# Patient Record
Sex: MALE | Race: WHITE | HISPANIC OR LATINO | URBAN - METROPOLITAN AREA
[De-identification: names, ages, dates, MRNs, and addresses within clinical notes are randomized per-mention and may not be internally consistent; named-entity substitution may affect disease eponyms.]

---

## 2017-01-22 ENCOUNTER — EMERGENCY (EMERGENCY)
Facility: HOSPITAL | Age: 22
LOS: 1 days | Discharge: HOME | End: 2017-01-22
Attending: EMERGENCY MEDICINE | Admitting: EMERGENCY MEDICINE
Payer: COMMERCIAL

## 2017-01-22 VITALS
SYSTOLIC BLOOD PRESSURE: 149 MMHG | HEART RATE: 89 BPM | TEMPERATURE: 98 F | OXYGEN SATURATION: 98 % | DIASTOLIC BLOOD PRESSURE: 99 MMHG | RESPIRATION RATE: 16 BRPM

## 2017-01-22 PROCEDURE — 70160 X-RAY EXAM OF NASAL BONES: CPT | Mod: 26

## 2017-01-22 PROCEDURE — 99283 EMERGENCY DEPT VISIT LOW MDM: CPT

## 2017-01-22 RX ADMIN — Medication 1 TABLET(S): at 13:03

## 2017-01-22 NOTE — ED PROVIDER NOTE - DETAILS:
MD Cardoza:  The scribe's documentation has been prepared under my direction and personally reviewed by me in its entirety. I confirm that the note above accurately reflects all work, treatment, procedures, and medical decision making performed by me.  MD Cardoza:  see the MDM & progress notes for my I&P.

## 2017-01-22 NOTE — ED PROVIDER NOTE - NS ED MD SCRIBE ATTENDING SCRIBE SECTIONS
HISTORY OF PRESENT ILLNESS/REVIEW OF SYSTEMS/HIV/DISPOSITION/PAST MEDICAL/SURGICAL/SOCIAL HISTORY/VITAL SIGNS( Pullset) HISTORY OF PRESENT ILLNESS/HIV/DISPOSITION/VITAL SIGNS( Pullset)/REVIEW OF SYSTEMS/PHYSICAL EXAM/PAST MEDICAL/SURGICAL/SOCIAL HISTORY

## 2017-01-22 NOTE — ED PROVIDER NOTE - OBJECTIVE STATEMENT
20 y/o M pt presents to the ED for nasal bridge deformity and associated pain s/p injury approx. 30 mins ago in which he was elbowed on the face while playing soccer. Denies LOC. 20 y/o M pt presenting with nasal pain and mild epistaxis immediately after being elbowed in the face playing soccer. Bleeding stopped with direct pressure. Main concern is grossly deformed nose. Denies LOC, change to sense of smell or neck pain.

## 2017-01-22 NOTE — ED PROVIDER NOTE - MEDICAL DECISION MAKING DETAILS
20 y/o M pt with closed nasal fracture without septal hematoma. Plan: plastics to reduce in ED. Patient is to follow up with plastics in 1 week. MD Cardoza:  22 y/o M pt with closed nasal fracture without septal hematoma < 2 hrs ago. Plan: plastics to reduce/splint in ED. Patient is to follow up with plastic surgery in 1 week.

## 2017-01-22 NOTE — ED PROVIDER NOTE - PLAN OF CARE
Follow up with Dr. Vick [phone], plastic surgeon tomorrow.  Clindamycin 600 mg 3 times a day for 10 days.  Worsening pain, fever, swelling, or any other other symptoms of concern return to ED.

## 2017-01-22 NOTE — ED PROVIDER NOTE - CONSTITUTIONAL, MLM
normal... Adult male. Well appearing, well nourished, awake, alert, oriented to person, place, time/situation and in no apparent distress.

## 2017-01-22 NOTE — ED PROVIDER NOTE - PROGRESS NOTE DETAILS
TELMA Leigh:  seen by plastics, XR confirms fracture, nose reset.  will d/c w/ augmentin, plastics f/u

## 2017-01-22 NOTE — ED PROVIDER NOTE - CARE PLAN
Principal Discharge DX:	Closed fracture of nasal bone, initial encounter Principal Discharge DX:	Closed fracture of nasal bone, initial encounter  Instructions for follow-up, activity and diet:	Follow up with Dr. Vick [phone], plastic surgeon tomorrow.  Clindamycin 600 mg 3 times a day for 10 days.  Worsening pain, fever, swelling, or any other other symptoms of concern return to ED. Principal Discharge DX:	Closed fracture of nasal bone, initial encounter  Instructions for follow-up, activity and diet:	Follow up with Dr. Vick [phone], plastic surgeon tomorrow.  Clindamycin 600 mg 3 times a day for 10 days.  Worsening pain, fever, swelling, or any other other symptoms of concern return to ED.

## 2017-01-22 NOTE — ED PROVIDER NOTE - EYES NEGATIVE STATEMENT, MLM
no discharge, no irritation, no pain, no redness, and no visual changes. No blurred vision or diplopia.

## 2017-01-31 ENCOUNTER — TRANSCRIPTION ENCOUNTER (OUTPATIENT)
Age: 22
End: 2017-01-31

## 2021-02-10 ENCOUNTER — APPOINTMENT (OUTPATIENT)
Dept: ORTHOPEDIC SURGERY | Facility: CLINIC | Age: 26
End: 2021-02-10
Payer: COMMERCIAL

## 2021-02-10 VITALS
WEIGHT: 190 LBS | HEIGHT: 72 IN | OXYGEN SATURATION: 97 % | BODY MASS INDEX: 25.73 KG/M2 | SYSTOLIC BLOOD PRESSURE: 119 MMHG | DIASTOLIC BLOOD PRESSURE: 65 MMHG | HEART RATE: 66 BPM

## 2021-02-10 DIAGNOSIS — M25.572 PAIN IN LEFT ANKLE AND JOINTS OF LEFT FOOT: ICD-10-CM

## 2021-02-10 DIAGNOSIS — Z78.9 OTHER SPECIFIED HEALTH STATUS: ICD-10-CM

## 2021-02-10 PROCEDURE — 99072 ADDL SUPL MATRL&STAF TM PHE: CPT

## 2021-02-10 PROCEDURE — 73610 X-RAY EXAM OF ANKLE: CPT | Mod: LT

## 2021-02-10 PROCEDURE — 99204 OFFICE O/P NEW MOD 45 MIN: CPT

## 2021-02-10 NOTE — HISTORY OF PRESENT ILLNESS
[Flip Flops] : flip flops [FreeTextEntry1] : Pt presents for initial evaluation with pain in his left ankle, pt evidently  was playing soccer yesterday  and rolled hisleft  ankle and felt crack and  fell to floor. Pt applied Ice and elevated he has taken Advil and Tylenol with no real relief.  Pt is a phys .

## 2021-02-10 NOTE — DISCUSSION/SUMMARY
[de-identified] : The patient was informed of his findings.  He was placed in a compression wrap given crutches and fitted for an ankle stirrup brace.  The patient will modify his weightbearing and activity level accordingly.  Will maintain elevation use ice and follow-up in 2 weeks time to check his progress.  Patient will be started on aspirin daily for DVT prophylaxis.

## 2021-02-10 NOTE — PHYSICAL EXAM
[de-identified] : Physical examination of the left ankle discloses lateral malleolar soft tissue swelling and tenderness.  Patient is resisting flexion extension and inversion of the ankle secondary to tenderness.  No perceivable instability appreciated within the limitations of this examination.  No defects or deformities otherwise noted.  Neurovascular status to the left foot and ankle intact.  No medial joint or malleolar tenderness [de-identified] : X-rays taken of the left ankle and AP lateral and internal oblique projections do not reveal any acute fractures.  An avulsion fragment is noted on the lateral projection which appears to be an old injury finding.

## 2021-02-19 ENCOUNTER — APPOINTMENT (OUTPATIENT)
Dept: ORTHOPEDIC SURGERY | Facility: CLINIC | Age: 26
End: 2021-02-19
Payer: COMMERCIAL

## 2021-02-19 VITALS
OXYGEN SATURATION: 97 % | BODY MASS INDEX: 25.73 KG/M2 | HEART RATE: 72 BPM | HEIGHT: 72 IN | SYSTOLIC BLOOD PRESSURE: 138 MMHG | WEIGHT: 190 LBS | DIASTOLIC BLOOD PRESSURE: 88 MMHG

## 2021-02-19 DIAGNOSIS — S93.412A SPRAIN OF CALCANEOFIBULAR LIGAMENT OF LEFT ANKLE, INITIAL ENCOUNTER: ICD-10-CM

## 2021-02-19 PROCEDURE — 99213 OFFICE O/P EST LOW 20 MIN: CPT

## 2021-02-19 PROCEDURE — 99072 ADDL SUPL MATRL&STAF TM PHE: CPT

## 2021-03-17 ENCOUNTER — APPOINTMENT (OUTPATIENT)
Dept: ORTHOPEDIC SURGERY | Facility: CLINIC | Age: 26
End: 2021-03-17
Payer: COMMERCIAL

## 2021-03-17 VITALS
SYSTOLIC BLOOD PRESSURE: 108 MMHG | OXYGEN SATURATION: 96 % | DIASTOLIC BLOOD PRESSURE: 75 MMHG | WEIGHT: 190 LBS | HEART RATE: 68 BPM | HEIGHT: 72 IN | BODY MASS INDEX: 25.73 KG/M2

## 2021-03-17 DIAGNOSIS — S93.492D SPRAIN OF OTHER LIGAMENT OF LEFT ANKLE, SUBSEQUENT ENCOUNTER: ICD-10-CM

## 2021-03-17 PROCEDURE — 73610 X-RAY EXAM OF ANKLE: CPT | Mod: LT

## 2021-03-17 PROCEDURE — 99072 ADDL SUPL MATRL&STAF TM PHE: CPT

## 2021-03-17 PROCEDURE — 99213 OFFICE O/P EST LOW 20 MIN: CPT

## 2021-03-17 NOTE — PHYSICAL EXAM
[de-identified] :  physical examination of the left ankle discloses mild tenderness to palpation with resolving soft tissue swelling lateral malleolus.  No acute instability no neurovascular deficits. [de-identified] : X-rays obtained today of the left ankle and AP lateral and oblique projections do not reveal any acute osseous changes.  No evidence of talar shift or widened medial clear space.

## 2021-03-17 NOTE — DISCUSSION/SUMMARY
[de-identified] : Patient was placed back in a compression wrap and ankle stirrup brace.  He was given instructions to continue elevation ice and use of the brace and compression wrap.  He will gradually progress his weightbearing status as tolerated.  Patient will also be referred to physical therapy as an adjunct modality.  Follow-up in 3 to 4 weeks to check his progress.  At that time repeat x-rays of the left ankle will be obtained.

## 2021-03-17 NOTE — HISTORY OF PRESENT ILLNESS
[Sneakers] : meagan [FreeTextEntry1] : Pt returns for follow up for his grade 2 left ankle sprain. Pt states he ran 3 days ago and felt discomfort t the lateral left ankle. Pt is not taking any pain medication. Pt is using a ankle compression sleeve for support.

## 2021-03-17 NOTE — DISCUSSION/SUMMARY
[de-identified] : Patient was informed of his findings.  He will continue using cryotherapy and elastic ankle support.  Patient will gradually return to sports and phys ed activities once his condition resolves.  Follow-up as needed

## 2021-03-17 NOTE — HISTORY OF PRESENT ILLNESS
[Other: ____] : [unfilled] [FreeTextEntry1] : Pt returns for follow up for his left ankle pain. Pt is ambulating with crutches and has an ace wrap and ankle stirrup brace in place. Pt has ecchymosis noted to the lateral ankle. Pt is taking Tylenol and Advil for pain prn. Pt is taking ASA daily.

## 2021-03-17 NOTE — PHYSICAL EXAM
[de-identified] : Physical examination of the left ankle disclosing residual lateral malleolar swelling and discoloration.  There is still considerable local tenderness of the lateral malleolar region, however there is no stress instability.

## 2021-06-14 ENCOUNTER — APPOINTMENT (OUTPATIENT)
Dept: ORTHOPEDIC SURGERY | Facility: CLINIC | Age: 26
End: 2021-06-14

## 2023-04-03 ENCOUNTER — TRANSCRIPTION ENCOUNTER (OUTPATIENT)
Age: 28
End: 2023-04-03

## 2023-04-03 ENCOUNTER — APPOINTMENT (OUTPATIENT)
Dept: ORTHOPEDIC SURGERY | Facility: CLINIC | Age: 28
End: 2023-04-03
Payer: COMMERCIAL

## 2023-04-03 ENCOUNTER — NON-APPOINTMENT (OUTPATIENT)
Age: 28
End: 2023-04-03

## 2023-04-03 PROBLEM — Z00.00 ENCOUNTER FOR PREVENTIVE HEALTH EXAMINATION: Noted: 2023-04-03

## 2023-04-03 PROCEDURE — 99214 OFFICE O/P EST MOD 30 MIN: CPT | Mod: 25

## 2023-04-03 PROCEDURE — 20610 DRAIN/INJ JOINT/BURSA W/O US: CPT | Mod: RT

## 2023-04-03 PROCEDURE — 73562 X-RAY EXAM OF KNEE 3: CPT | Mod: RT

## 2023-04-04 ENCOUNTER — APPOINTMENT (OUTPATIENT)
Dept: ORTHOPEDIC SURGERY | Facility: CLINIC | Age: 28
End: 2023-04-04

## 2023-04-04 LAB
B PERT IGG+IGM PNL SER: ABNORMAL
COLOR FLD: YELLOW
EOSINOPHIL # FLD MANUAL: 0 %
FLUID INTAKE SUBSTANCE CLASS: NORMAL
LYMPHOCYTES # FLD MANUAL: 26 %
MESOTHL CELL NFR FLD: 0 %
MONOS+MACROS NFR FLD MANUAL: 14 %
NEUTS SEG # FLD MANUAL: 60 %
NRBC # FLD: 0 %
RBC # FLD MANUAL: 3000 /UL
SYCRY CLARITY: ABNORMAL
SYCRY COLOR: YELLOW
SYCRY ID: NORMAL
SYCRY TUBE: NORMAL
TOTAL CELLS COUNTED FLD: NORMAL /UL
TUBE TYPE: NORMAL
UNIDENT CELLS NFR FLD MANUAL: 0 %
VARIANT LYMPHS # FLD MANUAL: 0 %

## 2023-04-05 ENCOUNTER — TRANSCRIPTION ENCOUNTER (OUTPATIENT)
Age: 28
End: 2023-04-05

## 2023-04-14 NOTE — PROCEDURE
[de-identified] : Aspiration: Right knee joint.\par Indication: Effusion. \par \par A discussion was had with the patient regarding this procedure and all questions were answered. All risks, benefits and alternatives were discussed. These included but were not limited to bleeding, infection, and reaccumulation of fluid. Alcohol was used to clean the skin, and betadine was used to sterilize and prep the area in the supero-lateral aspect of the right knee. Ethyl chloride spray was then used as a topical anesthetic. An 18-gauge needle was used to aspirate the knee joint and approximately 65 cc of cloudy inflammatory fluid was aspirated from the knee without complication. A sterile bandage was then applied. The patient tolerated the procedure well.

## 2023-04-14 NOTE — HISTORY OF PRESENT ILLNESS
[de-identified] : 27 year old male s/p right knee ACL reconstruction BTB and meniscus repair? 2021, BLU October 2021 by Dr. Alvares  presents today with right knee swelling and stiffness. Patient state that he was playing soccer on Sunday and developed soreness and swelling after. C/O stiffness. Advil/ NAproxen is not helpful. Denies buckling, catching, numbness or tingling. \par \par The patient's past medical history, past surgical history, medications and allergies were reviewed by me today with the patient and documented accordingly. In addition, the patient's family and social history, which were noncontributory to this visit, were reviewed also.

## 2023-04-14 NOTE — DISCUSSION/SUMMARY
[de-identified] : 26 y/o male with right knee effusion status post ACL reconstruction\par \par Patient presents for evaluation of acute right knee pain s/p ACL reconstruction. Clinically, the knee feels stable to anterior cruciate ligament testing both on anterior drawer and Lachman testing.. I do not suspect any significant internal derangement to the ligamentous structures.  Patient does report history of meniscal pathology, and I discussed that this may be of concern.  However, at this time would recommend treatment for persistent symptoms pertaining to his effusion. Patient was given aspiration for therapeutic and symptomatic relief of current effusion. The fluid was sent for analysis given cloudy inflammatory nature. \par \par Recommendation: Conservative care & observation; including rest/activity avoidance until less symptomatic with subsequent gradual return to full low impact activity as tolerated.  NSAIDs as prescribed, with application of ice/heat to the area 2-3x daily for 20 minutes after periods of activity.  Follow-up after laboratory analysis.\par \par Follow up 4wks as needed.

## 2023-04-14 NOTE — ADDENDUM
[FreeTextEntry1] : This note was written by Hillary Wall on 04/03/2023 acting solely as a scribe for Dr. Dennis Florez.\par \par All medical record entries made by the Scribe were at my, Dr. Dennis Florez, direction and personally dictated by me on 04/03/2023. I have personally reviewed the chart and agree that the record accurately reflects my personal performance of the history, physical exam, assessment and plan.

## 2023-04-14 NOTE — PHYSICAL EXAM
[de-identified] : Oriented to time, place, person\par Mood: Normal\par Affect: Normal\par Appearance: Healthy, well appearing, no acute distress.\par Gait: Normal\par Assistive Devices: None\par \par Right Knee Exam:\par \par Skin: Clean, dry, intact\par Inspection: No obvious malalignment, no masses, + swelling, + effusion\par Pulses: 2+ DP/PT pulses \par ROM: 0-120 degrees of flexion. + pain with deep knee flexion/extension.\par Tenderness: No MJLT. No LJLT. No pain over the patella facets. No pain to the quadriceps tendon. No pain to the patella tendon. No posterior knee tenderness.\par Stability: Stable to varus, valgus. IA Lachman testing. Negative anterior drawer, negative posterior drawer.\par Strength: 5/5 Q/H/TA/GS/EHL, without atrophy\par Neuro: Intact to light touch throughout, DTRs normal\par Additional Tests: Negative Mary's test, Negative patellar grind test  [de-identified] : The following radiographs were ordered and read by me during this patients visit. I reviewed each radiograph in detail with the patient and discussed the findings as highlighted below. \par \par 4 views of the right knee were obtained today, 04/03/2023, that show no acute fracture or dislocation. There is no medial, no lateral and no patellofemoral degenerative changes seen. There is no significant malalignment. There is evidence of prior ACL reconstruction no metal. Large effusion.

## 2023-04-17 ENCOUNTER — APPOINTMENT (OUTPATIENT)
Dept: ORTHOPEDIC SURGERY | Facility: CLINIC | Age: 28
End: 2023-04-17
Payer: COMMERCIAL

## 2023-04-17 DIAGNOSIS — M25.461 EFFUSION, RIGHT KNEE: ICD-10-CM

## 2023-04-17 PROCEDURE — 99213 OFFICE O/P EST LOW 20 MIN: CPT

## 2023-04-17 RX ORDER — NAPROXEN 500 MG/1
500 TABLET ORAL
Qty: 40 | Refills: 0 | Status: ACTIVE | COMMUNITY
Start: 2023-04-03 | End: 1900-01-01

## 2023-04-20 LAB — BACTERIA FLD CULT: NORMAL

## 2023-04-24 ENCOUNTER — APPOINTMENT (OUTPATIENT)
Dept: INTERNAL MEDICINE | Facility: CLINIC | Age: 28
End: 2023-04-24

## 2023-04-28 PROBLEM — M25.461 EFFUSION OF RIGHT KNEE: Status: ACTIVE | Noted: 2023-04-14

## 2023-04-28 NOTE — ADDENDUM
[FreeTextEntry1] : This note was written by Hillary Wall on 04/17/2023 acting solely as a scribe for Dr. Dennis Florez.\par \par All medical record entries made by the Scribe were at my, Dr. Dennis Florez, direction and personally dictated by me on 04/17/2023. I have personally reviewed the chart and agree that the record accurately reflects my personal performance of the history, physical exam, assessment and plan.

## 2023-04-28 NOTE — HISTORY OF PRESENT ILLNESS
[de-identified] : 27 year old male s/p right knee ACL (BTB auto) and meniscus repair? 2021, BLU October 2021 by Dr. Key presents for reevaluation of a right knee effusion. He reports significant improvement in pain and stiffness following aspiration 4/3/23. He has been taking naproxen 1-2x daily. He currently has no pain. He would like to return to sports and would like clearance to do so. Laboratory analysis of synovial fluid was normal as documented.

## 2023-04-28 NOTE — PHYSICAL EXAM
[de-identified] : Oriented to time, place, person\par Mood: Normal\par Affect: Normal\par Appearance: Healthy, well appearing, no acute distress.\par Gait: Normal\par Assistive Devices: None\par \par Right Knee Exam:\par \par Skin: Clean, dry, intact\par Inspection: No obvious malalignment, no masses, minimal swelling, no effusion\par Pulses: 2+ DP/PT pulses \par ROM: 0-130 degrees of flexion.\par Tenderness: No MJLT. No LJLT. No pain over the patella facets. No pain to the quadriceps tendon. No pain to the patella tendon. No posterior knee tenderness.\par Stability: Stable to varus, valgus. IA Lachman testing. Negative anterior drawer, negative posterior drawer.\par Strength: 5/5 Q/H/TA/GS/EHL, without atrophy\par Neuro: Intact to light touch throughout, DTRs normal\par Additional Tests: Negative Mary's test, Negative patellar grind test  [de-identified] : 4 views of the right knee were obtained 04/03/2023, that show no acute fracture or dislocation. There is no medial, no lateral and no patellofemoral degenerative changes seen. There is no significant malalignment. There is evidence of prior ACL reconstruction no metal. Large effusion.

## 2023-07-20 ENCOUNTER — APPOINTMENT (OUTPATIENT)
Dept: INTERNAL MEDICINE | Facility: CLINIC | Age: 28
End: 2023-07-20
Payer: COMMERCIAL

## 2023-07-20 VITALS
WEIGHT: 200 LBS | HEART RATE: 64 BPM | OXYGEN SATURATION: 98 % | BODY MASS INDEX: 27.09 KG/M2 | SYSTOLIC BLOOD PRESSURE: 124 MMHG | RESPIRATION RATE: 12 BRPM | HEIGHT: 72 IN | TEMPERATURE: 97.8 F | DIASTOLIC BLOOD PRESSURE: 77 MMHG

## 2023-07-20 DIAGNOSIS — Z00.00 ENCOUNTER FOR GENERAL ADULT MEDICAL EXAMINATION W/OUT ABNORMAL FINDINGS: ICD-10-CM

## 2023-07-20 DIAGNOSIS — Z82.49 FAMILY HISTORY OF ISCHEMIC HEART DISEASE AND OTHER DISEASES OF THE CIRCULATORY SYSTEM: ICD-10-CM

## 2023-07-20 PROCEDURE — 99385 PREV VISIT NEW AGE 18-39: CPT

## 2023-07-20 NOTE — ASSESSMENT
[FreeTextEntry1] : INITIAL CPE OF 28 Y OLD MALE WITH ONLY HX OF R ACL SURGERY 2 Y AGO = LABS AND RTO 1 Y

## 2023-07-20 NOTE — PHYSICAL EXAM
[No Acute Distress] : no acute distress [Well-Appearing] : well-appearing [Normal Sclera/Conjunctiva] : normal sclera/conjunctiva [PERRL] : pupils equal round and reactive to light [EOMI] : extraocular movements intact [Normal Outer Ear/Nose] : the outer ears and nose were normal in appearance [No JVD] : no jugular venous distention [No Lymphadenopathy] : no lymphadenopathy [Supple] : supple [Thyroid Normal, No Nodules] : the thyroid was normal and there were no nodules present [No Respiratory Distress] : no respiratory distress  [No Accessory Muscle Use] : no accessory muscle use [Clear to Auscultation] : lungs were clear to auscultation bilaterally [Normal Rate] : normal rate  [Regular Rhythm] : with a regular rhythm [Normal S1, S2] : normal S1 and S2 [No Murmur] : no murmur heard [No Carotid Bruits] : no carotid bruits [No Abdominal Bruit] : a ~M bruit was not heard ~T in the abdomen [No Varicosities] : no varicosities [No Edema] : there was no peripheral edema [No Palpable Aorta] : no palpable aorta [No Extremity Clubbing/Cyanosis] : no extremity clubbing/cyanosis [Soft] : abdomen soft [Non Tender] : non-tender [Non-distended] : non-distended [No Masses] : no abdominal mass palpated [No HSM] : no HSM [Normal Bowel Sounds] : normal bowel sounds [Normal Posterior Cervical Nodes] : no posterior cervical lymphadenopathy [Normal Anterior Cervical Nodes] : no anterior cervical lymphadenopathy [No CVA Tenderness] : no CVA  tenderness [No Spinal Tenderness] : no spinal tenderness [No Joint Swelling] : no joint swelling [Grossly Normal Strength/Tone] : grossly normal strength/tone [No Rash] : no rash [Coordination Grossly Intact] : coordination grossly intact [No Focal Deficits] : no focal deficits [Normal Affect] : the affect was normal [Normal Insight/Judgement] : insight and judgment were intact [de-identified] : R KNEE SCAR

## 2023-07-22 LAB
25(OH)D3 SERPL-MCNC: 31.8 NG/ML
ALBUMIN SERPL ELPH-MCNC: 5.1 G/DL
ALP BLD-CCNC: 100 U/L
ALT SERPL-CCNC: 48 U/L
ANION GAP SERPL CALC-SCNC: 12 MMOL/L
APPEARANCE: CLEAR
AST SERPL-CCNC: 35 U/L
BILIRUB SERPL-MCNC: 0.7 MG/DL
BILIRUBIN URINE: NEGATIVE
BLOOD URINE: NEGATIVE
BUN SERPL-MCNC: 13 MG/DL
CALCIUM SERPL-MCNC: 10.2 MG/DL
CHLORIDE SERPL-SCNC: 102 MMOL/L
CHOLEST SERPL-MCNC: 187 MG/DL
CO2 SERPL-SCNC: 26 MMOL/L
COLOR: YELLOW
CREAT SERPL-MCNC: 1.32 MG/DL
EGFR: 76 ML/MIN/1.73M2
GLUCOSE QUALITATIVE U: NEGATIVE MG/DL
GLUCOSE SERPL-MCNC: 93 MG/DL
HDLC SERPL-MCNC: 53 MG/DL
KETONES URINE: NEGATIVE MG/DL
LDLC SERPL CALC-MCNC: 106 MG/DL
LEUKOCYTE ESTERASE URINE: NEGATIVE
NITRITE URINE: NEGATIVE
NONHDLC SERPL-MCNC: 135 MG/DL
PH URINE: 7
POTASSIUM SERPL-SCNC: 4.4 MMOL/L
PROT SERPL-MCNC: 7.4 G/DL
PROTEIN URINE: NEGATIVE MG/DL
SODIUM SERPL-SCNC: 140 MMOL/L
SPECIFIC GRAVITY URINE: 1.01
TRIGL SERPL-MCNC: 166 MG/DL
TSH SERPL-ACNC: 1.45 UIU/ML
UROBILINOGEN URINE: 0.2 MG/DL

## 2023-11-16 ENCOUNTER — NON-APPOINTMENT (OUTPATIENT)
Age: 28
End: 2023-11-16

## 2024-11-26 ENCOUNTER — APPOINTMENT (OUTPATIENT)
Dept: INTERNAL MEDICINE | Facility: CLINIC | Age: 29
End: 2024-11-26
Payer: COMMERCIAL

## 2024-11-26 VITALS
OXYGEN SATURATION: 97 % | HEIGHT: 72 IN | SYSTOLIC BLOOD PRESSURE: 126 MMHG | HEART RATE: 62 BPM | DIASTOLIC BLOOD PRESSURE: 86 MMHG | WEIGHT: 200 LBS | BODY MASS INDEX: 27.09 KG/M2 | TEMPERATURE: 97.5 F | RESPIRATION RATE: 12 BRPM

## 2024-11-26 DIAGNOSIS — Z00.00 ENCOUNTER FOR GENERAL ADULT MEDICAL EXAMINATION W/OUT ABNORMAL FINDINGS: ICD-10-CM

## 2024-11-26 PROCEDURE — 99395 PREV VISIT EST AGE 18-39: CPT

## 2024-11-27 LAB
25(OH)D3 SERPL-MCNC: 28.7 NG/ML
ALBUMIN SERPL ELPH-MCNC: 4.7 G/DL
ALP BLD-CCNC: 86 U/L
ALT SERPL-CCNC: 66 U/L
ANION GAP SERPL CALC-SCNC: 13 MMOL/L
APPEARANCE: CLEAR
AST SERPL-CCNC: 33 U/L
BILIRUB SERPL-MCNC: 0.5 MG/DL
BILIRUBIN URINE: NEGATIVE
BLOOD URINE: NEGATIVE
BUN SERPL-MCNC: 17 MG/DL
CALCIUM SERPL-MCNC: 9.8 MG/DL
CHLORIDE SERPL-SCNC: 99 MMOL/L
CHOLEST SERPL-MCNC: 194 MG/DL
CO2 SERPL-SCNC: 27 MMOL/L
COLOR: YELLOW
CREAT SERPL-MCNC: 1.33 MG/DL
EGFR: 75 ML/MIN/1.73M2
GLUCOSE QUALITATIVE U: NEGATIVE MG/DL
GLUCOSE SERPL-MCNC: 85 MG/DL
HCT VFR BLD CALC: 46.3 %
HDLC SERPL-MCNC: 50 MG/DL
HGB BLD-MCNC: 15.5 G/DL
KETONES URINE: NEGATIVE MG/DL
LDLC SERPL CALC-MCNC: 124 MG/DL
LEUKOCYTE ESTERASE URINE: NEGATIVE
MCHC RBC-ENTMCNC: 29 PG
MCHC RBC-ENTMCNC: 33.5 G/DL
MCV RBC AUTO: 86.7 FL
NITRITE URINE: NEGATIVE
NONHDLC SERPL-MCNC: 145 MG/DL
PH URINE: 6.5
PLATELET # BLD AUTO: 277 K/UL
POTASSIUM SERPL-SCNC: 4.4 MMOL/L
PROT SERPL-MCNC: 7.5 G/DL
PROTEIN URINE: NEGATIVE MG/DL
RBC # BLD: 5.34 M/UL
RBC # FLD: 13.2 %
SODIUM SERPL-SCNC: 139 MMOL/L
SPECIFIC GRAVITY URINE: <1.005
TRIGL SERPL-MCNC: 116 MG/DL
TSH SERPL-ACNC: 1.6 UIU/ML
UROBILINOGEN URINE: 0.2 MG/DL
WBC # FLD AUTO: 6.76 K/UL

## 2025-07-02 ENCOUNTER — NON-APPOINTMENT (OUTPATIENT)
Age: 30
End: 2025-07-02

## 2025-07-03 ENCOUNTER — APPOINTMENT (OUTPATIENT)
Dept: ORTHOPEDIC SURGERY | Facility: CLINIC | Age: 30
End: 2025-07-03

## 2025-07-03 VITALS — BODY MASS INDEX: 27.09 KG/M2 | HEIGHT: 72 IN | WEIGHT: 200 LBS

## 2025-07-03 PROCEDURE — 73562 X-RAY EXAM OF KNEE 3: CPT | Mod: RT

## 2025-07-03 PROCEDURE — 99213 OFFICE O/P EST LOW 20 MIN: CPT
